# Patient Record
(demographics unavailable — no encounter records)

---

## 2025-07-16 NOTE — HISTORY OF PRESENT ILLNESS
[FreeTextEntry1] : Reji Chen is a 29 year old male with medical history significant for HTN, anxiety, PFO s/p repair former pt of [facility] for migraines presenting today to establish care.  He has dull throbbing headaches once a week, bilateral occipital or frontal. They are still preceded by visual aura, which is disabling. Pain is rated 6-7/10, typically lasts for a day, associated with light sensitivity. They started when he was 25 years old. He is an  and has had to miss work. He has been going to therapy for the past 3 years, because his job is very stressful. He works 8 hour shifts and changes night to day. He denies snoring, daytime somnolence. He has not seen PCP and cardiologist recently. Has not been taking BP medications. Right now he is taking OTC analgesia for HA and sleeps it off, did not find significant relief with sumatriptan, Ubrelvy, Nurtec with the aura, just the headache pain. He is hoping to change jobs soon.   _______________ JA last f/u 4/03/24 -  Patient returns to clinic for follow-up evaluation for headache syndrome possibly migrainous. Patient has failed palliatively with sumatriptan Nurtec, Ubrelvy and diclofenac. Patient has been experiencing headaches for approximately 1 year described as a dull throbbing pain located in the occipital region nonradiating, rated 4-5/10 with 1 to 2-day duration with 2-3 attacks per month.  Associated symptoms blurred vision, right eye pain behind the right globe described as a pressure, photophobia without phonophobia or osmophobia, nausea vomiting and irritability.  Auras: Visual changes with blurred vision 30 minutes prior to onset of headache. Triggers: None Past medical history significant for hypertension current medication Toprol 25 mg ER every 24 hours. MRI brain non-contrast 9/6/2023: Unremarkable

## 2025-07-16 NOTE — ASSESSMENT
[FreeTextEntry1] : 29 year old male with medical history significant for HTN, anxiety, PFO s/p repair former pt of [facility] for migraines presenting today to establish care. HAs are dull, throbbing, b/l, 6-7/10 pain, preceded by visual aura, a/w light sensitivity, disabling and affects his job. Occurs 4-5 days per month.  Physical exam no focal neuro deficit.  Pt meets criteria for episodic migraine w/ visual aura.   Recommendations: - discussed preventive Rx, pt would like to hold off on this for now - pt to start migraine HA journal - if he has consistent prodrome he should try taking rescue medications early in HA course  - samples given of Nurtec 75 mg ODT, Ubrelvy 100 mg, and Zavzpret 10 mg IN - instructions and SE profiles discussed - continue psychotherapy for stress/anxiety - laboratory work up for HA - adjunctive treatments discussed - trial FL 41 migraine lenses for photosensitivity.  - lifestyle modifications - good intake and hydration, sleep hygiene, stress reduction  Patient to return to office in 6 months, or sooner if needed. Patient understands to seek urgent medical evaluation for any new or worsening symptoms.

## 2025-07-16 NOTE — PHYSICAL EXAM
[FreeTextEntry1] : NEURO: Mental Status Oriented to person, place, time, and situation Speech is clear, fluent, and spontaneous. Comprehension and memory intact.   Cranial Nerves Visual fields full to confrontation Pupils equal, round, reactive to light. Extraocular movements intact. No nystagmus or ptosis. Facial sensation intact and symmetric in V1, V2, V3 Facial movement intact and symmetric Uvula midline, soft palate elevates normally Bilateral shoulder shrug intact Tongue midline, no tongue bite sign or deviation on protrusion   Motor Tone and bulk intact Shoulder abduction: 5/5 b/l Elbow flexion/extension: 5/5 bl Hand : 5/5 b/l Hip flexion/extension: 5/5 b/l No pronator drift   Sensation Light touch grossly intact   Coordination Normal finger to nose bilaterally No past pointing   Gait Normal stance, stride, and pivot turn    GEN: awake, alert, interactive, no acute distress EYES: sclera white, conjunctiva clear, no redness or discharge ENT: normal appearing outer ears, hearing grossly intact EXT: moving all extremities spontaneously SKIN: warm, dry